# Patient Record
Sex: MALE | Race: WHITE
[De-identification: names, ages, dates, MRNs, and addresses within clinical notes are randomized per-mention and may not be internally consistent; named-entity substitution may affect disease eponyms.]

---

## 2018-02-23 ENCOUNTER — HOSPITAL ENCOUNTER (EMERGENCY)
Dept: HOSPITAL 11 - JP.ED | Age: 28
Discharge: HOME | End: 2018-02-23
Payer: COMMERCIAL

## 2018-02-23 VITALS — SYSTOLIC BLOOD PRESSURE: 140 MMHG | DIASTOLIC BLOOD PRESSURE: 96 MMHG

## 2018-02-23 DIAGNOSIS — M94.0: Primary | ICD-10-CM

## 2018-02-23 DIAGNOSIS — J06.9: ICD-10-CM

## 2018-02-23 NOTE — EDM.PDOC
ED HPI GENERAL MEDICAL PROBLEM





- General


Chief Complaint: Gastrointestinal Problem


Stated Complaint: CHEST PAIN


Time Seen by Provider: 02/23/18 12:25


Source of Information: Reports: Patient, Family


History Limitations: Reports: No Limitations





- History of Present Illness


INITIAL COMMENTS - FREE TEXT/NARRATIVE: 





Hira presents today with complaints of cough for 7 weeks with epigastric/

sternal pain.  He reports the epigastric/sternal pain sometimes radiated to his 

left chest.  He denies palpitations, syncope, or SOB. 


He also states about 7 weeks ago he developed body aches, fever, chills with 

the cough for several days then became better. 


He has not taken any OTC medications. 





- Related Data


 Allergies











Allergy/AdvReac Type Severity Reaction Status Date / Time


 


No Known Allergies Allergy   Verified 10/25/13 10:37











Home Meds: 


 Home Meds





NK [No Known Home Meds]  02/23/18 [History]











Past Medical History





- Past Surgical History


GI Surgical History: Reports: Cholecystectomy, Hernia, Inguinal





Social & Family History





- Tobacco Use


Smoking Status *Q: Never Smoker


Second Hand Smoke Exposure: No





- Alcohol Use


Days Per Week of Alcohol Use: 0





- Recreational Drug Use


Recreational Drug Use: No





ED ROS GENERAL





- Review of Systems


Review Of Systems: See Below


Constitutional: Denies: Fever, Chills, Malaise, Weakness


HEENT: Reports: Other (Cough with scant mucus production for 7 weeks without 

improvement).  Denies: Ear Pain, Rhinitis, Sinus Problem, Throat Pain, Throat 

Swelling


Respiratory: Reports: Pleuritic Chest Pain, Cough.  Denies: Shortness of Breath

, Wheezing, Hemoptysis


Cardiovascular: Reports: Other (Mid-sternal/epigastric pain with radiation to 

left chest at times. ).  Denies: Dyspnea on Exertion, Edema, Lightheadedness, 

Orthopnea, Palpitations, PND, Syncope


Endocrine: Reports: No Symptoms


GI/Abdominal: Reports: No Symptoms


: Reports: No Symptoms


Musculoskeletal: Reports: No Symptoms


Skin: Reports: No Symptoms


Neurological: Reports: No Symptoms


Psychiatric: Reports: No Symptoms


Hematologic/Lymphatic: Reports: No Symptoms


Immunologic: Reports: No Symptoms





ED EXAM, GENERAL





- Physical Exam


Exam: See Below


Free Text/Narrative:: 





Nick is an alert, oriented and pleasant 28 year old male presenting to the 

ER today with complaints of cough for 7 weeks.  He denies significant mucus 

production with cough.  He also complaints of midsternal/epigastric pain for 7 

weeks.  The pain is reproducible with palpation and sometimes feels cramping or 

like a spasm. 


Exam Limited By: No Limitations


General Appearance: Alert, WD/WN, No Apparent Distress


Eye Exam: Bilateral Eye: EOMI, PERRL


Ears: Normal External Exam, Normal Canal, Hearing Grossly Normal, Normal TMs


Ear Exam: Bilateral Ear: Auricle Normal, Canal Normal, TM normal


Nose: Normal Inspection, Normal Mucosa, No Blood.  No: Nasal Swelling, Nasal 

Drainage


Throat/Mouth: Normal Inspection, Normal Lips, Normal Teeth, Normal Gums, Normal 

Oropharynx, Normal Voice, No Airway Compromise


Head: Atraumatic, Normocephalic


Neck: Normal Inspection, Supple, Non-Tender, Full Range of Motion.  No: 

Lymphadenopathy (R), Lymphadenopathy (L)


Respiratory/Chest: No Respiratory Distress, Other (Noted faint crackles to 

Right mid-lower lobe.  ).  No: No Accessory Muscle Use, Respiratory Distress, 

Rhonchi, Wheezing, Stridor, Pleural Rub, Accessory Muscle Use, Retractions, 

Splinting


Cardiovascular: Normal Peripheral Pulses, Regular Rate, Rhythm, No Edema, No 

Gallop, No Murmur, No Rub


Peripheral Pulses: 2+: Radial (L), Radial (R), Dorsalis Pedis (L), Dorsalis 

Pedis (R)


GI/Abdominal: Normal Bowel Sounds, Soft, Non-Tender, No Organomegaly, No 

Distention, No Abnormal Bruit, No Mass


Back Exam: Normal Inspection, Full Range of Motion.  No: CVA Tenderness (R), 

CVA Tenderness (L)


Extremities: Normal Inspection, Normal Range of Motion, Non-Tender, No Pedal 

Edema, Normal Capillary Refill


Neurological: Alert, Oriented, CN II-XII Intact, Normal Cognition, Normal Gait, 

No Motor/Sensory Deficits


Psychiatric: Normal Affect, Normal Mood


Skin Exam: Warm, Dry, Intact, Normal Color, No Rash


Lymphatic: No Adenopathy





EKG INTERPRETATION


EKG Date: 02/23/18


Rhythm: NSR


Axis: Normal


P-Wave: Present


QRS: Normal


ST-T: Normal


QT: Normal


EKG Interpretation Comments: 





Normal sinus without acute findings. 





Course





- Vital Signs


Last Recorded V/S: 


 Last Vital Signs











Temp  37.1 C   02/23/18 11:42


 


Pulse  97   02/23/18 11:42


 


Resp  16   02/23/18 11:42


 


BP  140/96 H  02/23/18 11:42


 


Pulse Ox  96   02/23/18 11:42














- Orders/Labs/Meds


Orders: 


 Active Orders 24 hr











 Category Date Time Status


 


 EKG Documentation Completion [RC] ASDIRECTED Care  02/23/18 12:36 Active


 


 EKG 12 Lead [EK] Routine Ther  02/23/18 12:36 Ordered














- Radiology Interpretation


Free Text/Narrative:: 





PA/LAT chest x-ray wet read, no acute findings noted. 


Radiology read pending. 





Departure





- Departure


Time of Disposition: 13:14


Disposition: Home, Self-Care 01


Condition: Good


Clinical Impression: 


 Upper respiratory infection, Costochondritis





Instructions:  Upper Respiratory Infection, Adult, Easy-to-Read, Costochondritis

, Easy-to-Read


Referrals: 


PCP,None [Primary Care Provider] - 


Forms:  ED Department Discharge


Additional Instructions: 


You have been evaluated and treated in the emergency room today for upper 

respiratory infection and costochondritis. 





EKG and chest x-ray were normal. 





Take azithromycin 500mg PO today then 250mg PO daily for the next 4 days. 





Take chlorpheniramine 4mg PO three times a day as needed. 





Ibuprofen 800mg PO three times a day as needed for pain. 


You can also take acetaminophen as needed for pain. 





Drink plenty of fluids to stay hydrated.





If your epigastric pain continues follow up with your primary provider for 

possible acid reducing medication. 





If you feel heart burn or acid reflux you may take omeprazole 20mg PO daily as 

needed. 





Follow up with Dr. Osman in 14 days for recheck. 





Return at any time for worsening, issues or concerns. 





- My Orders


Last 24 Hours: 


My Active Orders





02/23/18 12:36


EKG Documentation Completion [RC] ASDIRECTED 


EKG 12 Lead [EK] Routine 














- Assessment/Plan


Last 24 Hours: 


My Active Orders





02/23/18 12:36


EKG Documentation Completion [RC] ASDIRECTED 


EKG 12 Lead [EK] Routine 











Plan: 


Patient evaluated and treated in the emergency room today for upper respiratory 

infection and costochondritis. 





EKG and chest x-ray were normal. 





Take azithromycin 500mg PO today then 250mg PO daily for the next 4 days. 





Take chlorpheniramine 4mg PO three times a day as needed. 





Ibuprofen 800mg PO three times a day as needed for pain. 


He can also take acetaminophen as needed for pain. 





Drink plenty of fluids to stay hydrated.





If  epigastric pain continues follow up with primary provider for possible acid 

reducing medication. 





If he feels heart burn or acid reflux he may take omeprazole 20mg PO daily as 

needed. 





Follow up with Dr. Osman in 14 days for recheck. 





Return at any time for worsening, issues or concerns.